# Patient Record
Sex: FEMALE | Race: WHITE | NOT HISPANIC OR LATINO | Employment: UNEMPLOYED | URBAN - METROPOLITAN AREA
[De-identification: names, ages, dates, MRNs, and addresses within clinical notes are randomized per-mention and may not be internally consistent; named-entity substitution may affect disease eponyms.]

---

## 2017-07-11 ENCOUNTER — OFFICE VISIT (OUTPATIENT)
Dept: ORTHOPEDIC SURGERY | Facility: CLINIC | Age: 67
End: 2017-07-11

## 2017-07-11 VITALS
TEMPERATURE: 98.3 F | BODY MASS INDEX: 26.68 KG/M2 | HEIGHT: 67 IN | SYSTOLIC BLOOD PRESSURE: 138 MMHG | WEIGHT: 170 LBS | DIASTOLIC BLOOD PRESSURE: 90 MMHG

## 2017-07-11 DIAGNOSIS — G89.29 CHRONIC PAIN OF RIGHT KNEE: Primary | ICD-10-CM

## 2017-07-11 DIAGNOSIS — M25.561 CHRONIC PAIN OF RIGHT KNEE: Primary | ICD-10-CM

## 2017-07-11 PROCEDURE — 99204 OFFICE O/P NEW MOD 45 MIN: CPT | Performed by: ORTHOPAEDIC SURGERY

## 2017-07-11 PROCEDURE — 73562 X-RAY EXAM OF KNEE 3: CPT | Performed by: ORTHOPAEDIC SURGERY

## 2017-07-11 RX ORDER — AMLODIPINE BESYLATE 2.5 MG/1
TABLET ORAL
Refills: 1 | COMMUNITY
Start: 2017-05-15

## 2017-07-11 RX ORDER — FLUOXETINE HYDROCHLORIDE 20 MG/1
CAPSULE ORAL
Refills: 2 | COMMUNITY
Start: 2017-06-12

## 2017-07-11 RX ORDER — ATORVASTATIN CALCIUM 20 MG/1
TABLET, FILM COATED ORAL
Refills: 2 | COMMUNITY
Start: 2017-04-11

## 2017-07-11 RX ORDER — PAROXETINE HYDROCHLORIDE 20 MG/1
TABLET, FILM COATED ORAL
Refills: 2 | COMMUNITY
Start: 2017-04-13 | End: 2017-07-11

## 2017-07-11 RX ORDER — APIXABAN 5 MG/1
TABLET, FILM COATED ORAL
Refills: 2 | COMMUNITY
Start: 2017-04-17

## 2017-07-11 NOTE — PROGRESS NOTES
"Patient: Aarti Flores  YOB: 1950 66 y.o. female  Medical Record Number: [id number]    Chief Complaints:   Chief Complaint   Patient presents with   • Right Knee - Establish Care, Pain       History of Present Illness:Aarti Flores is a 66 y.o. female who presents with Complaints of right knee gait abnormality and occasional instability and feelings of shortening stride length.  She had a right total knee replacement performed by Dr. Carlyn Acharya in 2013.  He later had a revision for what is reported by the patient as loosening.  She's not complaining much of pain today more feelings of difficulty with walking and instability.  Of note she had a stroke about a year ago which involves her speech memory and perhaps to some extent her gait.  She is not complaining of pain today.    Allergies: No Known Allergies    Medications:   Current Outpatient Prescriptions   Medication Sig Dispense Refill   • amLODIPine (NORVASC) 2.5 MG tablet TAKE 1 TABLET (2.5 MG) BY ORAL ROUTE ONCE DAILY  1   • atorvastatin (LIPITOR) 20 MG tablet TAKE 1 TABLET (20 MG) BY ORAL ROUTE ONCE DAILY  2   • ELIQUIS 5 MG tablet tablet TAKE 1 TABLET (5 MG) BY ORAL ROUTE 2 TIMES PER DAY  2   • FLUoxetine (PROzac) 20 MG capsule TAKE 1 CAPSULE EVERY DAY  2     No current facility-administered medications for this visit.          The following portions of the patient's history were reviewed and updated as appropriate: allergies, current medications, past family history, past medical history, past social history, past surgical history and problem list.    Review of Systems:   A 14 point review of systems was performed. All systems negative except pertinent positives/negative listed in HPI above    Physical Exam:   Vitals:    07/11/17 1530   BP: 138/90   BP Location: Right arm   Patient Position: Standing   Temp: 98.3 °F (36.8 °C)   TempSrc: Temporal Artery    Weight: 170 lb (77.1 kg)   Height: 67\" (170.2 cm)       General: A " and O x 3, ASA, NAD    SCLERA:    Normal    DENTITION:   Normal  She has a neutrally aligned knee with a well-healed surgical incision.  She lacks about 45° of extension and flexes to 1:15.  There is no varus valgus instability in flexion or extension.  There is no joint effusion.  The calf is soft and she is intact to light touch with palpable distal pulses she walks with a fairly slow short stride length gait.  The alignment is neutral in stance.  Quad strength is 4+ out of 5.  There is no lymphadenopathy no erythema around the incision and no sign of deep infection       Radiology:  Xrays 3views right knee (ap,lateral, sunrise) were ordered and reviewed for evaluation of knee pain demonstrating a knee replacement which demonstrates a stemmed tibial component with a varus producing angled wedge under the tibial implant.  The overall tibial implant is in about 5-7° of varus but appears to be well fixed.  There is metal debris about the tibial bone consistent with previous loose implant or possibly due to the revision.  The femoral component appears to be about 5-10° of valgus alignment and on the lateral view appears appropriately sized.  It is a press-fit implant was slight lucencies anterior but no evidence of divergent lucencies.  I compared them to previous films and it appears essentially unchanged.  There is also a press-fit metal-backed patellar component again no obvious loosening noted    Assessment/Plan: Right total knee gait abnormality following revision knee replacement surgery.  Interestingly the tibia is in varus and the femur is in valgus which results in a mechanically neutral leg.  There is a possibility that the femoral component could be incompletely fixed or possibly stable fibrous but she's not having pain so I don't think surgery would be a good option at this point.  I recommended physical therapy for gait training and quad strengthening and should she develop further pain she will call and  I be happy to see her back with repeat x-rays      Andrew Fraire MD  7/11/2017